# Patient Record
Sex: MALE | Race: WHITE | Employment: OTHER | URBAN - METROPOLITAN AREA
[De-identification: names, ages, dates, MRNs, and addresses within clinical notes are randomized per-mention and may not be internally consistent; named-entity substitution may affect disease eponyms.]

---

## 2017-02-08 ENCOUNTER — APPOINTMENT (OUTPATIENT)
Dept: GENERAL RADIOLOGY | Facility: HOSPITAL | Age: 82
DRG: 312 | End: 2017-02-08
Payer: MEDICARE

## 2017-02-08 ENCOUNTER — HOSPITAL ENCOUNTER (INPATIENT)
Facility: HOSPITAL | Age: 82
LOS: 4 days | Discharge: HOME | DRG: 312 | End: 2017-02-14
Attending: EMERGENCY MEDICINE | Admitting: HOSPITALIST
Payer: MEDICARE

## 2017-02-08 ENCOUNTER — APPOINTMENT (OUTPATIENT)
Dept: GENERAL RADIOLOGY | Facility: HOSPITAL | Age: 82
DRG: 312 | End: 2017-02-08
Attending: EMERGENCY MEDICINE
Payer: MEDICARE

## 2017-02-08 DIAGNOSIS — E86.0 DEHYDRATION: ICD-10-CM

## 2017-02-08 DIAGNOSIS — I95.1 ORTHOSTATIC HYPOTENSION: Primary | ICD-10-CM

## 2017-02-08 LAB
ANION GAP SERPL CALC-SCNC: 13 MMOL/L (ref 0–18)
BASOPHILS # BLD: 0.1 K/UL (ref 0–0.2)
BASOPHILS NFR BLD: 1 %
BUN SERPL-MCNC: 30 MG/DL (ref 8–20)
BUN/CREAT SERPL: 17.2 (ref 10–20)
CALCIUM SERPL-MCNC: 9.4 MG/DL (ref 8.5–10.5)
CHLORIDE SERPL-SCNC: 107 MMOL/L (ref 95–110)
CO2 SERPL-SCNC: 20 MMOL/L (ref 22–32)
CREAT SERPL-MCNC: 1.74 MG/DL (ref 0.5–1.5)
EOSINOPHIL # BLD: 0.1 K/UL (ref 0–0.7)
EOSINOPHIL NFR BLD: 1 %
ERYTHROCYTE [DISTWIDTH] IN BLOOD BY AUTOMATED COUNT: 14.2 % (ref 11–15)
GLUCOSE SERPL-MCNC: 126 MG/DL (ref 70–99)
HCT VFR BLD AUTO: 42 % (ref 41–52)
HGB BLD-MCNC: 14.2 G/DL (ref 13.5–17.5)
LYMPHOCYTES # BLD: 1.1 K/UL (ref 1–4)
LYMPHOCYTES NFR BLD: 15 %
MCH RBC QN AUTO: 33 PG (ref 27–32)
MCHC RBC AUTO-ENTMCNC: 33.9 G/DL (ref 32–37)
MCV RBC AUTO: 97.3 FL (ref 80–100)
MONOCYTES # BLD: 0.6 K/UL (ref 0–1)
MONOCYTES NFR BLD: 8 %
NEUTROPHILS # BLD AUTO: 5.8 K/UL (ref 1.8–7.7)
NEUTROPHILS NFR BLD: 76 %
OSMOLALITY UR CALC.SUM OF ELEC: 298 MOSM/KG (ref 275–295)
PLATELET # BLD AUTO: 201 K/UL (ref 140–400)
PMV BLD AUTO: 8.5 FL (ref 7.4–10.3)
POTASSIUM SERPL-SCNC: 4 MMOL/L (ref 3.3–5.1)
RBC # BLD AUTO: 4.32 M/UL (ref 4.5–5.9)
SODIUM SERPL-SCNC: 140 MMOL/L (ref 136–144)
TROPONIN I SERPL-MCNC: 0 NG/ML (ref ?–0.03)
TROPONIN I SERPL-MCNC: 0 NG/ML (ref ?–0.03)
WBC # BLD AUTO: 7.7 K/UL (ref 4–11)

## 2017-02-08 PROCEDURE — 99220 INITIAL OBSERVATION CARE,LEVL III: CPT | Performed by: HOSPITALIST

## 2017-02-08 PROCEDURE — 71010 XR CHEST AP PORTABLE  (CPT=71010): CPT

## 2017-02-08 RX ORDER — ASPIRIN 325 MG
325 TABLET ORAL DAILY
Status: DISCONTINUED | OUTPATIENT
Start: 2017-02-08 | End: 2017-02-14

## 2017-02-08 RX ORDER — DOCUSATE SODIUM 100 MG/1
100 CAPSULE, LIQUID FILLED ORAL 2 TIMES DAILY
Status: DISCONTINUED | OUTPATIENT
Start: 2017-02-08 | End: 2017-02-14

## 2017-02-08 RX ORDER — POLYETHYLENE GLYCOL 3350 17 G/17G
17 POWDER, FOR SOLUTION ORAL DAILY PRN
Status: DISCONTINUED | OUTPATIENT
Start: 2017-02-08 | End: 2017-02-14

## 2017-02-08 RX ORDER — ONDANSETRON 2 MG/ML
4 INJECTION INTRAMUSCULAR; INTRAVENOUS EVERY 6 HOURS PRN
Status: DISCONTINUED | OUTPATIENT
Start: 2017-02-08 | End: 2017-02-14

## 2017-02-08 RX ORDER — ACETAMINOPHEN 325 MG/1
650 TABLET ORAL EVERY 6 HOURS PRN
Status: DISCONTINUED | OUTPATIENT
Start: 2017-02-08 | End: 2017-02-14

## 2017-02-08 RX ORDER — SODIUM PHOSPHATE, DIBASIC AND SODIUM PHOSPHATE, MONOBASIC 7; 19 G/133ML; G/133ML
1 ENEMA RECTAL ONCE AS NEEDED
Status: ACTIVE | OUTPATIENT
Start: 2017-02-08 | End: 2017-02-08

## 2017-02-08 RX ORDER — ATORVASTATIN CALCIUM 10 MG/1
10 TABLET, FILM COATED ORAL NIGHTLY
Status: DISCONTINUED | OUTPATIENT
Start: 2017-02-08 | End: 2017-02-14

## 2017-02-08 RX ORDER — NITROGLYCERIN 0.4 MG/1
0.4 TABLET SUBLINGUAL EVERY 5 MIN PRN
Status: DISCONTINUED | OUTPATIENT
Start: 2017-02-08 | End: 2017-02-14

## 2017-02-08 RX ORDER — LEVOTHYROXINE SODIUM 0.05 MG/1
50 TABLET ORAL
Status: DISCONTINUED | OUTPATIENT
Start: 2017-02-09 | End: 2017-02-14

## 2017-02-08 RX ORDER — SODIUM CHLORIDE 9 MG/ML
INJECTION, SOLUTION INTRAVENOUS CONTINUOUS
Status: DISCONTINUED | OUTPATIENT
Start: 2017-02-08 | End: 2017-02-11

## 2017-02-08 RX ORDER — BISACODYL 10 MG
10 SUPPOSITORY, RECTAL RECTAL
Status: DISCONTINUED | OUTPATIENT
Start: 2017-02-08 | End: 2017-02-14

## 2017-02-08 NOTE — H&P
[facility]    PATIENT'S NAME: Dangelo Mabry   ATTENDING PHYSICIAN: Seamus Hurtado MD   PATIENT ACCOUNT#:   [de-identified]    LOCATION:  [place] [address]  MEDICAL RECORD #:   [id number]       YOB: 1931  ADMISSION DATE:       02/08/201 against a chair, developed ecchymosis on both eyes, but he did not seek medical attention. Denies any chest pain or shortness of breath. No palpitations. Other 12-point review of systems is negative. PHYSICAL EXAMINATION:    GENERAL:  Alert.   Cornell Holland

## 2017-02-08 NOTE — ED NOTES
Patient is resting comfortably. Pt updated on plan of care. Fall precautions maintained. Left pt watching TV.

## 2017-02-08 NOTE — ED PROVIDER NOTES
Patient Seen in: [facility] AND [facility] Emergency Department    History   No chief complaint on file.     Stated Complaint: Syncope     HPI    The patient is an 80-year-old male who went to an outpatient lab today for routine lab work and when he was walking i 10 MG Oral Tab,     Levothyroxine Sodium (SYNTHROID) 50 MCG Oral Tab,     lisinopril (PRINIVIL,ZESTRIL) 5 MG Oral Tab,         Family History   Problem Relation Age of Onset   • Glaucoma Neg    • Macular degeneration Neg    • Retinal detachment Neg    • Kalani Zarate Bowel sounds are normal. He exhibits no distension and no mass. There is no tenderness. There is no rebound and no guarding. Musculoskeletal: Normal range of motion. He exhibits no edema or tenderness.    Neurological: He is alert and oriented to person, rest    Pulse Ox: 97%, Normal, room air    Cardiac Monitor: Pulse Readings from Last 1 Encounters:  02/08/17 : 78  , sinus, normal     Radiology findings: Xr Chest Ap Portable  (cpt=71010)    2/8/2017  CONCLUSION:  1. No acute cardiopulmonary disease.  Mini

## 2017-02-08 NOTE — ED NOTES
Pt resting on cart in no acute distress. Pt denies complaints at this time. Pt requesting to go home. \"I feel fine. Why do I need to stay? \". Pt denies c/o dizziness, chest pain or SOB.

## 2017-02-08 NOTE — ED NOTES
Orthostatics done per order. Pt states he \"wasn't dizzy, but I was real good standing, either\". Pt appeared steady on feet when standing.

## 2017-02-08 NOTE — CM/SW NOTE
Spoke with patient regarding where he left his car s/p coming here via ems. Pt. Reports car being at a [facility] outpatient lab in [address] [place] [address].  Pt. States, \"It's been taken care of-- my car is fine but I'm worried about getting back to my car and if it

## 2017-02-08 NOTE — ED INITIAL ASSESSMENT (HPI)
Pt to ED via EMS c/o near-syncopal episode while walking into clinic to have blood drawn. Pt was assisted to sitting position and did not pass out. Pt states he has not been eating well lately and did take his meds this morning.  Pt denies pain/complaints

## 2017-02-09 ENCOUNTER — APPOINTMENT (OUTPATIENT)
Dept: CV DIAGNOSTICS | Facility: HOSPITAL | Age: 82
DRG: 312 | End: 2017-02-09
Attending: HOSPITALIST
Payer: MEDICARE

## 2017-02-09 ENCOUNTER — APPOINTMENT (OUTPATIENT)
Dept: NUCLEAR MEDICINE | Facility: HOSPITAL | Age: 82
DRG: 312 | End: 2017-02-09
Attending: HOSPITALIST
Payer: MEDICARE

## 2017-02-09 LAB
ANION GAP SERPL CALC-SCNC: 8 MMOL/L (ref 0–18)
BASOPHILS # BLD: 0.1 K/UL (ref 0–0.2)
BASOPHILS NFR BLD: 1 %
BUN SERPL-MCNC: 26 MG/DL (ref 8–20)
BUN/CREAT SERPL: 17 (ref 10–20)
CALCIUM SERPL-MCNC: 8.6 MG/DL (ref 8.5–10.5)
CHLORIDE SERPL-SCNC: 111 MMOL/L (ref 95–110)
CHOLEST SERPL-MCNC: 114 MG/DL (ref 110–200)
CO2 SERPL-SCNC: 22 MMOL/L (ref 22–32)
CREAT SERPL-MCNC: 1.53 MG/DL (ref 0.5–1.5)
EOSINOPHIL # BLD: 0.1 K/UL (ref 0–0.7)
EOSINOPHIL NFR BLD: 2 %
ERYTHROCYTE [DISTWIDTH] IN BLOOD BY AUTOMATED COUNT: 14.1 % (ref 11–15)
GLUCOSE SERPL-MCNC: 99 MG/DL (ref 70–99)
HCT VFR BLD AUTO: 36.4 % (ref 41–52)
HDLC SERPL-MCNC: 37 MG/DL
HGB BLD-MCNC: 12.4 G/DL (ref 13.5–17.5)
LDLC SERPL CALC-MCNC: 60 MG/DL (ref 0–99)
LYMPHOCYTES # BLD: 1.3 K/UL (ref 1–4)
LYMPHOCYTES NFR BLD: 22 %
MCH RBC QN AUTO: 33.1 PG (ref 27–32)
MCHC RBC AUTO-ENTMCNC: 34.2 G/DL (ref 32–37)
MCV RBC AUTO: 97 FL (ref 80–100)
MONOCYTES # BLD: 0.6 K/UL (ref 0–1)
MONOCYTES NFR BLD: 9 %
NEUTROPHILS # BLD AUTO: 3.9 K/UL (ref 1.8–7.7)
NEUTROPHILS NFR BLD: 66 %
NONHDLC SERPL-MCNC: 77 MG/DL
OSMOLALITY UR CALC.SUM OF ELEC: 297 MOSM/KG (ref 275–295)
PLATELET # BLD AUTO: 181 K/UL (ref 140–400)
PMV BLD AUTO: 8.4 FL (ref 7.4–10.3)
POTASSIUM SERPL-SCNC: 4 MMOL/L (ref 3.3–5.1)
RBC # BLD AUTO: 3.75 M/UL (ref 4.5–5.9)
SODIUM SERPL-SCNC: 141 MMOL/L (ref 136–144)
TRIGL SERPL-MCNC: 87 MG/DL (ref 1–149)
WBC # BLD AUTO: 6 K/UL (ref 4–11)

## 2017-02-09 PROCEDURE — 78452 HT MUSCLE IMAGE SPECT MULT: CPT

## 2017-02-09 PROCEDURE — 99232 SBSQ HOSP IP/OBS MODERATE 35: CPT | Performed by: HOSPITALIST

## 2017-02-09 PROCEDURE — 93306 TTE W/DOPPLER COMPLETE: CPT

## 2017-02-09 PROCEDURE — 93017 CV STRESS TEST TRACING ONLY: CPT

## 2017-02-09 PROCEDURE — 93018 CV STRESS TEST I&R ONLY: CPT | Performed by: INTERNAL MEDICINE

## 2017-02-09 PROCEDURE — 93016 CV STRESS TEST SUPVJ ONLY: CPT | Performed by: INTERNAL MEDICINE

## 2017-02-09 PROCEDURE — 93306 TTE W/DOPPLER COMPLETE: CPT | Performed by: INTERNAL MEDICINE

## 2017-02-09 RX ORDER — SODIUM CHLORIDE 0.9 % (FLUSH) 0.9 %
SYRINGE (ML) INJECTION
Status: COMPLETED
Start: 2017-02-09 | End: 2017-02-09

## 2017-02-09 NOTE — PAYOR COMM NOTE
Gagan Harrison #[id number]  (80 year old M)       [facility] [id number]         Abhay Moreno MD Physician Signed  H&P 2/8/2017  1:20 PM      Expand All Collapse [facility]      PATIENT'S NAME:  Jhoana PACHECO    ATTENDING PHYSICIAN:  Franck Stinson lightheaded, especially when he stands up.  Lately it has been very difficult for him to ambulate around without feeling very dizzy.  One week ago he fell and he hit his face and nose against a chair, developed ecchymosis on both eyes, but he did not seek AM             OBSV

## 2017-02-09 NOTE — PHYSICAL THERAPY NOTE
PHYSICAL THERAPY EVALUATION - INPATIENT     Room Number: 933/121-L  Evaluation Date: 2/9/2017  Type of Evaluation: Initial  Physician Order: PT Eval and Treat    Presenting Problem: orthostatic hypotension  Reason for Therapy: Mobility Dysfunction and level  Stairs to Enter : 0     Stairs to [employer]: 0       Lives With: Alone  Drives: Yes  Patient Owned Equipment: None       Prior Level of [place]: Pt was independent w/ ADLs and amb w/o AD. SUBJECTIVE  \"I can walk to the bathroom just fine. \" addressed; Alarm set    ASSESSMENT     Patient is a 80year old male admitted 2/8/2017 for orthostatic hypotension. In this PT evaluation, the patient presents with the following impairments: Consulted w/ RN prior to seeing pt.  Pt was received supine in b training;Strengthening  Rehab Potential : Good  Frequency (Obs): 3x/week         CURRENT GOALS    Goal #1 Patient is able to demonstrate supine - sit EOB @ level: modified independent     Goal #2 Patient is able to demonstrate transfers Sit to/from Stand a

## 2017-02-09 NOTE — PROGRESS NOTES
[facility]    Progress Note    Lexus Bell Patient Status:  Observation    10/14/1931 MRN [id number]   Location [facility] Attending Jae Torres MD   Hosp Day # 1 PCP Nadia Faith III       Subjective:   Lexus Bell is a tab 50 mcg, 50 mcg, Oral, Before breakfast    Assessment and Plan:     Orthostatic hypotension  Likely 2/2 hypovolemia, poor PO intake  Hold BP meds  S/p lexiscan stress test which was negative  2D echo showing preserved EF, mild L atrial enlargement  Will

## 2017-02-09 NOTE — DISCHARGE PLANNING
SW attempted to meet w/ pt to discuss plan of care per MD order. Pt was currently not in room at this time. SW to follow up when appropriate.     Denton Taylor, [address]

## 2017-02-10 LAB — TSH SERPL-ACNC: 2.07 UIU/ML (ref 0.34–5.6)

## 2017-02-10 PROCEDURE — 99232 SBSQ HOSP IP/OBS MODERATE 35: CPT | Performed by: HOSPITALIST

## 2017-02-10 NOTE — PROGRESS NOTES
[facility]    Progress Note    Tony Kelly Patient Status:  Observation    10/14/1931 MRN [id number]   Location [facility] Attending Malgorzata Olivares MD   Hosp Day # 2 PCP Bettina Storey III       Subjective:   Tony Kelly is a 10 mg, Oral, Nightly  •  Levothyroxine Sodium (SYNTHROID) tab 50 mcg, 50 mcg, Oral, Before breakfast    Assessment and Plan:     Orthostatic hypotension  persistent  Likely 2/2 hypovolemia, poor PO intake  Hold BP meds  S/p lexiscan stress test which was n

## 2017-02-10 NOTE — PHYSICAL THERAPY NOTE
PHYSICAL THERAPY TREATMENT NOTE - INPATIENT    Room Number: 217/810-D       Presenting Problem: orthostatic hypotension    Problem List  Principal Problem:    Orthostatic hypotension  Active Problems:    Dehydration      ASSESSMENT   Patient up in B/S elise a chair (including a wheelchair)?: A Little   -   Need to walk in hospital room?: A Little   -   Climbing 3-5 steps with a railing?: A Little    AM-PAC Score:  Raw Score: 18   PT Approx Degree of Impairment Score: 46.58%   Standardized Score (AM-PAC Scale)

## 2017-02-10 NOTE — PLAN OF CARE
CARDIOVASCULAR - ADULT    • Maintains optimal cardiac output and hemodynamic stability Progressing    • Absence of cardiac arrhythmias or at baseline Progressing        SAFETY ADULT - FALL    • Free from fall injury Progressing          Orthostatics remain

## 2017-02-10 NOTE — DISCHARGE PLANNING
SW met w/ pt to discuss plan of care per MD order. Pt reported that he lives at home alone in a condo w/ elevator. Pt reported no DME/services at this time. Pt reported being independent w/ ADL's and drives. Pt reported no hx of SNF/HHC.  Pt reported that h

## 2017-02-10 NOTE — OCCUPATIONAL THERAPY NOTE
OCCUPATIONAL THERAPY EVALUATION - INPATIENT     Room Number: 796/915-M  Evaluation Date: 2/10/2017  Type of Evaluation: Initial  Presenting Problem:  (Near Syncope)    Physician Order: IP Consult to Occupational Therapy  Reason for Therapy: ADL/IADL Dysfun hypertension    • Prostate cancer ([facility] [address] 1989   • Hypothyroidism      Hypothyroidism, primary   • Vitreous floaters of both eyes    • Entropion of right lower eyelid 2012   • Ophthalmological disorder 2013     Capsular phimosis after cataract, OD       Past A Little  -   Bathing (including washing, rinsing, drying)?: A Little  -   Toileting, which includes using toilet, bedpan or urinal? : A Little  -   Putting on and taking off regular upper body clothing?: A Little  -   Taking care of personal grooming such

## 2017-02-11 LAB
ANION GAP SERPL CALC-SCNC: 5 MMOL/L (ref 0–18)
BASOPHILS # BLD: 0 K/UL (ref 0–0.2)
BASOPHILS NFR BLD: 1 %
BUN SERPL-MCNC: 18 MG/DL (ref 8–20)
BUN/CREAT SERPL: 13.6 (ref 10–20)
CALCIUM SERPL-MCNC: 8.3 MG/DL (ref 8.5–10.5)
CHLORIDE SERPL-SCNC: 114 MMOL/L (ref 95–110)
CO2 SERPL-SCNC: 23 MMOL/L (ref 22–32)
CREAT SERPL-MCNC: 1.32 MG/DL (ref 0.5–1.5)
EOSINOPHIL # BLD: 0.2 K/UL (ref 0–0.7)
EOSINOPHIL NFR BLD: 3 %
ERYTHROCYTE [DISTWIDTH] IN BLOOD BY AUTOMATED COUNT: 14.2 % (ref 11–15)
GLUCOSE SERPL-MCNC: 100 MG/DL (ref 70–99)
HCT VFR BLD AUTO: 33.1 % (ref 41–52)
HGB BLD-MCNC: 11.4 G/DL (ref 13.5–17.5)
LYMPHOCYTES # BLD: 1.2 K/UL (ref 1–4)
LYMPHOCYTES NFR BLD: 20 %
MCH RBC QN AUTO: 33.3 PG (ref 27–32)
MCHC RBC AUTO-ENTMCNC: 34.5 G/DL (ref 32–37)
MCV RBC AUTO: 96.4 FL (ref 80–100)
MONOCYTES # BLD: 0.6 K/UL (ref 0–1)
MONOCYTES NFR BLD: 9 %
NEUTROPHILS # BLD AUTO: 4.3 K/UL (ref 1.8–7.7)
NEUTROPHILS NFR BLD: 68 %
OSMOLALITY UR CALC.SUM OF ELEC: 296 MOSM/KG (ref 275–295)
PLATELET # BLD AUTO: 164 K/UL (ref 140–400)
PMV BLD AUTO: 8.2 FL (ref 7.4–10.3)
POTASSIUM SERPL-SCNC: 4.1 MMOL/L (ref 3.3–5.1)
RBC # BLD AUTO: 3.43 M/UL (ref 4.5–5.9)
SODIUM SERPL-SCNC: 142 MMOL/L (ref 136–144)
WBC # BLD AUTO: 6.3 K/UL (ref 4–11)

## 2017-02-11 PROCEDURE — 99223 1ST HOSP IP/OBS HIGH 75: CPT | Performed by: OTHER

## 2017-02-11 PROCEDURE — 99232 SBSQ HOSP IP/OBS MODERATE 35: CPT | Performed by: HOSPITALIST

## 2017-02-11 NOTE — CONSULTS
Neurology Inpatient Consult Note    Ceci Flores : 10/14/1931   Referring Physician: Dr. Zoila Espinoza  HPI:     Ceci Flores is a 80year old male who is being seen in neurologic evaluation. Patient is being seen in evaluation for dizziness.   Per review Name:                       Years of Education:                 Number of children:               Social History Main Topics    Smoking Status: Former Smoker                   Packs/Day: 0.00  Years:         Alcohol Use: No              Drug Use: No rule out component of autonomic neuropathy. –Daily orthostatic vital signs, orthostatic precautions    –If orthostasis continues despite hydration, may consider midodrine or Florinef      Neurology service will continue to follow.   Please page with any

## 2017-02-11 NOTE — PROGRESS NOTES
[facility]    Progress Note    Odin Eng Patient Status:  Observation    10/14/1931 MRN [id number]   Location [address] [facility] Attending Kellie Salgado MD   Hosp Day # 3 PCP Illona Fail III       Subjective:   Odin Eng is a breakfast    Assessment and Plan:     Orthostatic hypotension  persistent  Likely 2/2 hypovolemia, poor PO intake  Cannot r/o autonomic dysfunction  Hold BP meds  S/p lexiscan stress test which was negative  2D echo showing preserved EF, mild L atrial enla

## 2017-02-12 PROCEDURE — 99232 SBSQ HOSP IP/OBS MODERATE 35: CPT | Performed by: HOSPITALIST

## 2017-02-12 PROCEDURE — 99232 SBSQ HOSP IP/OBS MODERATE 35: CPT | Performed by: OTHER

## 2017-02-12 RX ORDER — ENALAPRILAT 2.5 MG/2ML
0.62 INJECTION INTRAVENOUS ONCE
Status: COMPLETED | OUTPATIENT
Start: 2017-02-12 | End: 2017-02-12

## 2017-02-12 NOTE — PROGRESS NOTES
Neurology Inpatient Follow-up Note      HPI:     Patient being seen in follow-up. Offers no complaints. Still orthostatic by vital signs, but denies any subjective dizziness or lightheadedness. Able to ambulate with PT today.       Past Medical Hisotory: Please page with any questions / concerns.     Reed Chambers MD  [address] [place] [address] [phone]

## 2017-02-12 NOTE — PROGRESS NOTES
[facility]    Progress Note    Bryn Clemons Patient Status:  Observation    10/14/1931 MRN [id number]   Location [facility] Attending Cecilia Banks MD   Hosp Day # 4 PCP Dorothy Glover III       Subjective:   Bryn Clemons is a and Plan:     Orthostatic hypotension  persistent  M/l 2/2 hypovolemia, poor PO intake  Cannot r/o autonomic dysfunction  Hold BP meds  S/p lexiscan stress test which was negative  2D echo showing preserved EF, mild L atrial enlargement   PT/OT on consult

## 2017-02-12 NOTE — PHYSICAL THERAPY NOTE
PHYSICAL THERAPY TREATMENT NOTE - INPATIENT    Room Number: 021/751-Z     Session: PT Progress        Presenting Problem: Orthostatic Hypotension    Problem List  Principal Problem:    Orthostatic hypotension  Active Problems:    Dehydration      Past Med and blankets)?: None   -   Sitting down on and standing up from a chair with arms (e.g., wheelchair, bedside commode, etc.): None   -   Moving from lying on back to sitting on the side of the bed?: None   How much help from another person does the patient able to demonstrate supine - sit EOB @ level: modified independent       Goal #2   Patient is able to demonstrate transfers Sit to/from Stand at assistance level: modified independent       Goal #3   Patient is able to ambulate 300 feet with assist device:

## 2017-02-13 PROCEDURE — 99232 SBSQ HOSP IP/OBS MODERATE 35: CPT | Performed by: OTHER

## 2017-02-13 PROCEDURE — 99232 SBSQ HOSP IP/OBS MODERATE 35: CPT | Performed by: HOSPITALIST

## 2017-02-13 RX ORDER — LISINOPRIL 5 MG/1
2.5 TABLET ORAL NIGHTLY
Qty: 15 TABLET | Refills: 0 | Status: SHIPPED | OUTPATIENT
Start: 2017-02-13 | End: 2017-03-15

## 2017-02-13 NOTE — PROGRESS NOTES
[facility]    Progress Note    Irwin Mayer Patient Status:  Observation    10/14/1931 MRN [id number]   Location [facility] Attending Radha Pineda MD   Hosp Day # 5 PCP Juan Guy III       Subjective:   Irwin Mayer is a mcg, Oral, Before breakfast    Assessment and Plan:     Orthostatic hypotension  persistent  M/l 2/2 hypovolemia, poor PO intake  Cannot r/o autonomic dysfunction  Hold BP meds  S/p lexiscan stress test which was negative  2D echo showing preserved EF, mil

## 2017-02-13 NOTE — DISCHARGE PLANNING
SW received call from therapy indicating the pt is requesting caretaker resources. SW initiated referral to [facility] [place] for evaluation of caretaker programs. Therapy recommendation is for home health services. Residential chosen.  Refer

## 2017-02-13 NOTE — HOME CARE LIAISON
MET WITH PTNT TO DISCUSS HOME HEALTH SERVICES AND COVERAGE CRITERIA. PTNT AGREEABLE TO [address] [place] [address]. PTNT GIVEN RESIDENTIAL BROCHURE AND CONTACT INFORMATION. Felix RN/PT.   PTNT ADMITTED AT [address] [place] [address] 2/8/17 D/T CHRISTY

## 2017-02-13 NOTE — PHYSICAL THERAPY NOTE
PHYSICAL THERAPY TREATMENT NOTE - INPATIENT    Room Number: 341/453-O       Presenting Problem: Orthostatic Hypotension    Problem List  Principal Problem:    Orthostatic hypotension  Active Problems:    Dehydration      ASSESSMENT   Consulted w/ RN prior chair with arms (e.g., wheelchair, bedside commode, etc.): A Little   -   Moving from lying on back to sitting on the side of the bed?: A Little   How much help from another person does the patient currently need. ..   -   Moving to and from a bed to a ganga

## 2017-02-13 NOTE — PROGRESS NOTES
Neurology Inpatient Follow-up Note      HPI:     Patient being seen in follow-up. Still orthostatic by vital signs, but denies any subjective dizziness or lightheadedness.        Past Medical Hisotory:  Reviewed    Medications:  Reviewed    Allergies:  No

## 2017-02-13 NOTE — DISCHARGE PLANNING
Therapy recommendation is for home health services. Residential chosen. Referral made to [facility] C/Anny who will check the pt's insurance.  [place] [id number] orders and Face to Face encounter are needed to confirm the referral.    ANGLE delgado [id number]

## 2017-02-13 NOTE — PLAN OF CARE
Maintains optimal cardiac output and hemodynamic stability Progressing      Absence of cardiac arrhythmias or at baseline Progressing      Free from fall injury Progressing    No c/o dizziness, bed alarm maintained, up with walker and one standbye assist

## 2017-02-13 NOTE — DISCHARGE SUMMARY
[facility]    Discharge Summary    Freida Nash Patient Status:  Inpatient    10/14/1931 MRN [id number]   Location [facility] Attending Pa Daniel MD   Hosp Day # 5 PCP Adam Lynn III     Date of Admission: 2017 Apryl Bowman BS+  MUSCULOSKELETAL:  There was no deformity.  There was full range of motion in all the extremities.    EXTREMITIES: There was no edema  NEUROLOGIC: no focal defecits    History of Present Illness:    The patient is an 80-year-old  male who prese taking these medications       Instructions Prescription details    aspirin 81 MG Tabs   Last time this was given:  325 mg on 2/13/2017  8:07 AM        Take 81 mg by mouth nightly.     Refills:  0       Atorvastatin Calcium 10 MG Tabs   Last time this was g

## 2017-02-14 VITALS
DIASTOLIC BLOOD PRESSURE: 51 MMHG | SYSTOLIC BLOOD PRESSURE: 91 MMHG | RESPIRATION RATE: 16 BRPM | WEIGHT: 176.19 LBS | BODY MASS INDEX: 27.65 KG/M2 | HEART RATE: 81 BPM | HEIGHT: 67 IN | OXYGEN SATURATION: 96 % | TEMPERATURE: 98 F

## 2017-02-14 PROCEDURE — 99239 HOSP IP/OBS DSCHRG MGMT >30: CPT | Performed by: HOSPITALIST

## 2017-02-14 NOTE — OCCUPATIONAL THERAPY NOTE
OCCUPATIONAL THERAPY TREATMENT NOTE - INPATIENT     Room Number: 480/146-R          Presenting Problem: orthostatic hypotension    Problem List  Principal Problem:    Orthostatic hypotension  Active Problems:    Dehydration      ASSESSMENT   Pt seen in  taking off regular upper body clothing?: None  -   Taking care of personal grooming such as brushing teeth?: None  -   Eating meals?: None    AM-PAC Score:  Score: 23  Approx Degree of Impairment: 15.86%  Standardized Score (AM-PAC Scale): 51.12  CMS Modif

## 2017-02-14 NOTE — PHYSICAL THERAPY NOTE
PHYSICAL THERAPY TREATMENT NOTE - INPATIENT    Room Number: 753/402-X       Presenting Problem: Orthostatic Hypotension    Problem List  Principal Problem:    Orthostatic hypotension  Active Problems:    Dehydration      ASSESSMENT   Patient received sitt Pressure: Sitting 112/67 and Standing 03/93    AM-PAC '6-Clicks' INPATIENT SHORT FORM - BASIC MOBILITY  How much difficulty does the patient currently have. ..  -   Turning over in bed (including adjusting bedclothes, sheets and blankets)?: None   -   Sitti

## 2017-02-14 NOTE — PAYOR COMM NOTE
Alea Hines #[id number]  (80 year old M)       [facility] [id number]         Gloria Joyce MD Physician Signed  H&P 2/8/2017  1:20 PM      Expand All Collapse [facility]      PATIENT'S NAME:  Karyle Maier D    ATTENDING PHYSICIAN:  Mario Yao lightheaded, especially when he stands up.  Lately it has been very difficult for him to ambulate around without feeling very dizzy.  One week ago he fell and he hit his face and nose against a chair, developed ecchymosis on both eyes, but he did not seek DA Hall Lajulien #[id number]  (80 year old M)       [facility] [id number]         Irvin Manriquez MD Physician Signed  Discharge Summaries 2/13/2017  3:44 PM      Expand All Collapse [facility]    Discharge Summary Calm and cooperative    HEENT:  Head was atraumatic and normocephalic.  Eyes: Sclera was anicteric.  Pupils were equal.   NECK:  Supple.  There was no JVD.    CHEST:  Symmetrical movement on inspiration  CARDIAC: S1 S2+, RRR  LUNGS:  CTAB  ABDOMEN: Non-dis          Instructions  Prescription details      lisinopril 5 MG Tabs    Commonly known as:  GHULAM ELMROESTRIZANDER    What changed:  how much to take           Take 0.5 tablets (2.5 mg total) by mouth nightly.      Stop taking on:  3/15/2017    Quantity:  15 ta

## 2017-05-02 ENCOUNTER — APPOINTMENT (OUTPATIENT)
Dept: GENERAL RADIOLOGY | Facility: HOSPITAL | Age: 82
DRG: 640 | End: 2017-05-02
Attending: EMERGENCY MEDICINE
Payer: MEDICARE

## 2017-05-02 ENCOUNTER — HOSPITAL ENCOUNTER (INPATIENT)
Facility: HOSPITAL | Age: 82
LOS: 3 days | Discharge: NURSING, REHAB OR LONG-TERM CARE | DRG: 640 | End: 2017-05-05
Attending: EMERGENCY MEDICINE | Admitting: HOSPITALIST
Payer: MEDICARE

## 2017-05-02 DIAGNOSIS — R53.1 WEAKNESS GENERALIZED: ICD-10-CM

## 2017-05-02 DIAGNOSIS — N17.9 ACUTE KIDNEY INJURY (HCC): Primary | ICD-10-CM

## 2017-05-02 DIAGNOSIS — E86.0 DEHYDRATION: ICD-10-CM

## 2017-05-02 DIAGNOSIS — I10 ESSENTIAL HYPERTENSION: ICD-10-CM

## 2017-05-02 PROBLEM — R79.89 AZOTEMIA: Status: ACTIVE | Noted: 2017-05-02

## 2017-05-02 PROBLEM — R73.9 HYPERGLYCEMIA: Status: ACTIVE | Noted: 2017-05-02

## 2017-05-02 PROCEDURE — 93010 ELECTROCARDIOGRAM REPORT: CPT | Performed by: EMERGENCY MEDICINE

## 2017-05-02 PROCEDURE — 85025 COMPLETE CBC W/AUTO DIFF WBC: CPT | Performed by: EMERGENCY MEDICINE

## 2017-05-02 PROCEDURE — 84443 ASSAY THYROID STIM HORMONE: CPT | Performed by: EMERGENCY MEDICINE

## 2017-05-02 PROCEDURE — 99285 EMERGENCY DEPT VISIT HI MDM: CPT

## 2017-05-02 PROCEDURE — 96360 HYDRATION IV INFUSION INIT: CPT

## 2017-05-02 PROCEDURE — 96361 HYDRATE IV INFUSION ADD-ON: CPT

## 2017-05-02 PROCEDURE — 80076 HEPATIC FUNCTION PANEL: CPT | Performed by: EMERGENCY MEDICINE

## 2017-05-02 PROCEDURE — 84484 ASSAY OF TROPONIN QUANT: CPT | Performed by: EMERGENCY MEDICINE

## 2017-05-02 PROCEDURE — 93005 ELECTROCARDIOGRAM TRACING: CPT

## 2017-05-02 PROCEDURE — 71010 XR CHEST AP PORTABLE  (CPT=71010): CPT

## 2017-05-02 PROCEDURE — 80048 BASIC METABOLIC PNL TOTAL CA: CPT | Performed by: EMERGENCY MEDICINE

## 2017-05-02 RX ORDER — SODIUM CHLORIDE 9 MG/ML
INJECTION, SOLUTION INTRAVENOUS CONTINUOUS
Status: DISCONTINUED | OUTPATIENT
Start: 2017-05-03 | End: 2017-05-03

## 2017-05-02 RX ORDER — HEPARIN SODIUM 5000 [USP'U]/ML
5000 INJECTION, SOLUTION INTRAVENOUS; SUBCUTANEOUS EVERY 12 HOURS
Status: DISCONTINUED | OUTPATIENT
Start: 2017-05-03 | End: 2017-05-05

## 2017-05-02 RX ORDER — ACETAMINOPHEN 325 MG/1
650 TABLET ORAL EVERY 6 HOURS PRN
Status: DISCONTINUED | OUTPATIENT
Start: 2017-05-02 | End: 2017-05-03

## 2017-05-02 RX ORDER — SODIUM CHLORIDE 9 MG/ML
INJECTION, SOLUTION INTRAVENOUS CONTINUOUS
Status: DISCONTINUED | OUTPATIENT
Start: 2017-05-03 | End: 2017-05-04

## 2017-05-02 RX ORDER — ONDANSETRON 2 MG/ML
4 INJECTION INTRAMUSCULAR; INTRAVENOUS EVERY 6 HOURS PRN
Status: DISCONTINUED | OUTPATIENT
Start: 2017-05-02 | End: 2017-05-05

## 2017-05-02 RX ORDER — SODIUM CHLORIDE 9 MG/ML
INJECTION, SOLUTION INTRAVENOUS ONCE
Status: COMPLETED | OUTPATIENT
Start: 2017-05-02 | End: 2017-05-02

## 2017-05-03 ENCOUNTER — APPOINTMENT (OUTPATIENT)
Dept: CT IMAGING | Facility: HOSPITAL | Age: 82
DRG: 640 | End: 2017-05-03
Attending: HOSPITALIST
Payer: MEDICARE

## 2017-05-03 PROBLEM — G93.40 ENCEPHALOPATHY ACUTE: Status: ACTIVE | Noted: 2017-05-03

## 2017-05-03 PROCEDURE — 85025 COMPLETE CBC W/AUTO DIFF WBC: CPT | Performed by: HOSPITALIST

## 2017-05-03 PROCEDURE — 80048 BASIC METABOLIC PNL TOTAL CA: CPT | Performed by: HOSPITALIST

## 2017-05-03 PROCEDURE — 70450 CT HEAD/BRAIN W/O DYE: CPT

## 2017-05-03 PROCEDURE — 97530 THERAPEUTIC ACTIVITIES: CPT

## 2017-05-03 PROCEDURE — 81003 URINALYSIS AUTO W/O SCOPE: CPT | Performed by: HOSPITALIST

## 2017-05-03 PROCEDURE — 97166 OT EVAL MOD COMPLEX 45 MIN: CPT

## 2017-05-03 RX ORDER — ATORVASTATIN CALCIUM 10 MG/1
10 TABLET, FILM COATED ORAL NIGHTLY
Status: DISCONTINUED | OUTPATIENT
Start: 2017-05-03 | End: 2017-05-05

## 2017-05-03 RX ORDER — LEVOTHYROXINE SODIUM 0.05 MG/1
50 TABLET ORAL
Status: DISCONTINUED | OUTPATIENT
Start: 2017-05-03 | End: 2017-05-05

## 2017-05-03 RX ORDER — 0.9 % SODIUM CHLORIDE 0.9 %
VIAL (ML) INJECTION
Status: COMPLETED
Start: 2017-05-03 | End: 2017-05-03

## 2017-05-03 RX ORDER — ASPIRIN 81 MG/1
81 TABLET ORAL NIGHTLY
Status: DISCONTINUED | OUTPATIENT
Start: 2017-05-03 | End: 2017-05-05

## 2017-05-03 RX ORDER — FOLIC ACID 1 MG/1
1 TABLET ORAL DAILY
Status: DISCONTINUED | OUTPATIENT
Start: 2017-05-03 | End: 2017-05-05

## 2017-05-03 RX ORDER — AMLODIPINE BESYLATE 2.5 MG/1
2.5 TABLET ORAL DAILY
Status: DISCONTINUED | OUTPATIENT
Start: 2017-05-03 | End: 2017-05-05

## 2017-05-03 RX ORDER — ACETAMINOPHEN 325 MG/1
650 TABLET ORAL EVERY 6 HOURS PRN
Status: DISCONTINUED | OUTPATIENT
Start: 2017-05-03 | End: 2017-05-05

## 2017-05-03 RX ORDER — MELATONIN
100 DAILY
Status: DISCONTINUED | OUTPATIENT
Start: 2017-05-03 | End: 2017-05-05

## 2017-05-03 NOTE — H&P
[facility] Hospitalist H&P       CC: Patient presents with:  Dehydration (metabolic/constitutional)       PCP: Ammy Hood III    History of Present Illness: Patient is a 80year old male with PMH sig for prostate cancer, hypothyroidism, HTN, who presents with con Neg    • Retinal detachment Neg    • Diabetes Neg        Review of Systems  Comprehensive ROS reviewed and negative except for what's stated above.      OBJECTIVE:  /62 mmHg  Pulse 74  Temp(Src) 97.2 °F (36.2 °C) (Oral)  Resp 18  Ht 5' 9\" (1.753 m) AO*2, repeats the same sentence over and over  - no focal findings, poss metabolic due to electrolytes vs. Infectious  - CXR negative, awaiting UA  - CT ordered  - continue IVF  - start thiamine and folic acid    Acute renal failure on CKD  - cre 2.1 on ad

## 2017-05-03 NOTE — OCCUPATIONAL THERAPY NOTE
OCCUPATIONAL THERAPY EVALUATION - INPATIENT     Room Number: 786/141-G  Evaluation Date: 5/3/2017  Type of Evaluation: Initial  Presenting Problem:  (NEISHA)    Physician Order: IP Consult to Occupational Therapy  Reason for Therapy: ADL/IADL Dysfunction and Hyperglycemia    Azotemia    Weakness generalized    Essential hypertension    NEISHA (acute kidney injury) ([facility] [address]    Encephalopathy acute      Past Medical History  Past Medical History   Diagnosis Date   • Cataract 2006     OU   • Ptosis of both eyelids 2012 ASSESSMENT  Upper extremity strength is within functional limits     COORDINATION  Gross Motor: WFL   Fine Motor: WFL     ACTIVITIES OF DAILY LIVING ASSESSMENT  AM-PAC ‘6-Clicks’ Inpatient Daily Activity Short Form  How much help from another person does t

## 2017-05-03 NOTE — ED PROVIDER NOTES
Patient Seen in: [facility] Emergency Department    History   Patient presents with:  Dehydration (metabolic/constitutional)    Stated Complaint:     HPI    Patient presents the emergency department complaining of feeling very weak.   He states [employer]    Positive for stated complaint:   Other systems are as noted in HPI. Constitutional and vital signs reviewed. All other systems reviewed and negative except as noted above.     [facility] and agreed except as otherwise other components within normal limits   CBC W/ DIFFERENTIAL - Abnormal; Notable for the following:     RBC 4.45 (*)     MCH 32.9 (*)     All other components within normal limits   TSH W REFLEX TO FREE T4 - Normal   TROPONIN I, 0 HOUR - Normal   CBC WITH D Admission           ICD-10-CM Noted POA    Acute kidney injury ([facility] [address] N17.9 5/2/2017 Unknown    Azotemia R79.89 5/2/2017 Yes    Hyperglycemia R73.9 5/2/2017 Yes

## 2017-05-03 NOTE — PAYOR COMM NOTE
Attending Physician: Syl Mosley MD    Review Type: ADMISSION   Reviewer: Carol Warren       Date: May 3, 2017 - 2:58 PM  Payor: [address] [place] [address] Number: [id number]  Admit date: 5/2/2017  7:18 PM   Admitted from Emergency Dept. EYE  2013    Comment Yag laser, OD       Medications :   aspirin 81 MG Oral Tab,  Take 81 mg by mouth nightly.      Levothyroxine Sodium (SYNTHROID) 50 MCG Oral Tab,  Take by mouth before breakfast.     Atorvastatin Calcium (LIPITOR) 10 MG Oral Tab,  Take 1 and dry. No rash noted. Nursing note and vitals reviewed.             ED Course     Labs Reviewed   BASIC METABOLIC PANEL (8) - Abnormal; Notable for the following:     Glucose 134 (*)     BUN 50 (*)     Creatinine 2.15 (*)     BUN/CREA Ratio 23.3 (*) Radiology exams  Viewed and reviewed by myself and findings discussed with patient including need for follow up    Discussed with Dr. Viridiana Remy. Patient will be admitted to a medical floor for dehydration and acute kidney injury.     Disposition and • Age-related nuclear cataract of left eye 8/20/2014   • Pseudophakia, right eye 8/20/2014   • History of entropion repair 8/20/2014   • Essential hypertension    • Prostate cancer ([facility] [address] 1989   • Hypothyroidism      Hypothyroidism, primary   • Vitreous normal, atraumatic, no cyanosis or edema. Skin: Skin color, texture, turgor normal. No rashes or lesions.     Neurologic: Normal strength,  No focal findings     Diagnostic Data:    CBC/Chem  Recent Labs   Lab  05/02/17 1958 05/03/17   0712   WBC  7.7 piv    Dispo: pending clinical course, requires inpatient admission due to acute encephalopathy, need for further work up and requirement of IVF    Outpatient records or previous hospital records reviewed.      Further recommendations pending patient's clin

## 2017-05-03 NOTE — PLAN OF CARE
Problem: NEUROLOGICAL - ADULT  Goal: Achieves stable or improved neurological status  INTERVENTIONS  - Assess for and report changes in neurological status  - Monitor temperature, glucose, and sodium.  Initiate appropriate interventions as ordered  Outcome: assistive devices as appropriate  - Consider OT/PT consult to assist with strengthening/mobility  - Encourage toileting schedule  Outcome: Not Progressing

## 2017-05-03 NOTE — PAYOR COMM NOTE
Marley Pablo #[id number]  (80 year old M)       [facility] [id number]         Deborah Ray MD Physician Signed  H&P 5/3/2017  8:32 AM      Expand All Collapse All        [facility] Hospitalist H&P        CC: Patient presents with:  Dehydration (metabolic/constitution No outpatient prescriptions have been marked as taking for the 5/2/17 encounter [facility] HOSPITAL Encounter).          Soc Hx      Smoking status:  Former Smoker        Smokeless tobacco:  Not on file      Alcohol Use:  No         Fam Hx  Family History    Problem  Radiology: Xr Chest Ap Portable  (cpt=71010)    5/2/2017  CONCLUSION:           1. Stable findings from February 8, 2017. 2. Atherosclerosis. 3. Hyperinflation. 4. Scarring/atelectasis. 5. Demineralization. 6. Scoliosis. 7. Osteoarthritis.                AS   5/3/2017  8:52 AM MD Beverly Mathur Fax

## 2017-05-03 NOTE — DISCHARGE PLANNING
SW met w/ pt to discuss discharge planning. Pt appeared alert and oriented x3. Pt was answering questions appropriately and only had to be redirected a couple of times. Pt reported that he lives at home alone in an apartment.  Pt reported there are no stair

## 2017-05-03 NOTE — PLAN OF CARE
Focus: HOME MEDS  Data: PT IS CONFUSED AT PRESENT, HE DOES NOT KNOW HIS MEDICATIONS. HE REPORTED THAT HE STOPPED TAKING IT FOR A WHILE NOW. THE PT ALSO DOES NOT KNOW HIS PREVIOUS PHARMACY.  WILL NOTIFY MD IN AM.

## 2017-05-03 NOTE — PLAN OF CARE
Problem: Patient/Family Goals  Goal: Patient/Family Long Term Goal  Patient’s Long Term Goal: Be safe at home    Interventions:  - PT/OT eval for d/c placement recommendations  - Continue safety precautions, alarms on, call light within reach.   - See addit desenex powder ordered.    Goal: Incision(s), wounds(s) or drain site(s) healing without S/S of infection  INTERVENTIONS:  - Assess and document risk factors for pressure ulcer development  - Assess and document skin integrity  - Assess and document dressin

## 2017-05-03 NOTE — PAYOR COMM NOTE
Admit Orders     Start     Ordered    05/03/17 0462  Admit to inpatient Once   Once     Ordering Provider:  Angelita Blevins MD   Question:  Diagnosis  Answer:  Encephalopathy acute    05/03/17 0831    05/03/17 0801  Admit to inpatient Once   Once     Ordering P

## 2017-05-04 PROCEDURE — 85025 COMPLETE CBC W/AUTO DIFF WBC: CPT | Performed by: HOSPITALIST

## 2017-05-04 PROCEDURE — 97162 PT EVAL MOD COMPLEX 30 MIN: CPT

## 2017-05-04 PROCEDURE — 82607 VITAMIN B-12: CPT | Performed by: HOSPITALIST

## 2017-05-04 PROCEDURE — 97530 THERAPEUTIC ACTIVITIES: CPT

## 2017-05-04 PROCEDURE — 80048 BASIC METABOLIC PNL TOTAL CA: CPT | Performed by: HOSPITALIST

## 2017-05-04 RX ORDER — SODIUM CHLORIDE 9 MG/ML
INJECTION, SOLUTION INTRAVENOUS CONTINUOUS
Status: DISCONTINUED | OUTPATIENT
Start: 2017-05-04 | End: 2017-05-05

## 2017-05-04 NOTE — PLAN OF CARE
Problem: Patient/Family Goals  Goal: Patient/Family Long Term Goal  Patient’s Long Term Goal: Be safe at home    Interventions:  - PT/OT eval for d/c placement recommendations  - Continue safety precautions, alarms on, call light within reach.   - See addit

## 2017-05-04 NOTE — PLAN OF CARE
Problem: NEUROLOGICAL - ADULT  Goal: Achieves stable or improved neurological status  INTERVENTIONS  - Assess for and report changes in neurological status  - Monitor temperature, glucose, and sodium.  Initiate appropriate interventions as ordered   Outcome and tolerated well, continues with IV fluids, extra 250ml bolus given this evening d/t orthostatic blood pressure readings per MD order, all needs met, call light left within reach, continue to monitor.

## 2017-05-04 NOTE — PROGRESS NOTES
[facility] Hospitalist Progress Note     CC: Hospital Follow up    PCP: LOCO MORELAND III       Assessment/Plan:     Principal Problem:    Acute kidney injury ([address]  Active Problems:    Dehydration    Hyperglycemia    Azotemia    Weakness generalized    Essential hype (36.5 °C)-98 °F (36.7 °C)] 97.7 °F (36.5 °C)  Pulse:  [71-95] 76  Resp:  [18] 18  BP: ()/(43-82) 127/76 mmHg      Intake/Output:    Intake/Output Summary (Last 24 hours) at 05/04/17 1316  Last data filed at 05/04/17 0655   Gross per 24 hour   Intake 114*   --    CO2  24  22  20*   --        Recent Labs   Lab  05/02/17 1958   ALT  25   AST  31   ALB  3.4*         Imaging:  Ct Brain Or Head ([id number])    5/3/2017  CONCLUSION:  1. No acute intracranial process by noncontrast CT technique.   2. Senescent ch

## 2017-05-04 NOTE — DISCHARGE PLANNING
Pt has been clinically accepted by [facility]. PT saw the pt today, so the updates have been sent to [facility] for the insurance authorization to be continued.     ANGLE delgado [id number]

## 2017-05-04 NOTE — PHYSICAL THERAPY NOTE
PHYSICAL THERAPY EVALUATION - INPATIENT     Room Number: 532/532-A  Evaluation Date: 5/4/2017  Type of Evaluation: Initial  Physician Order: PT Eval and Treat    Presenting Problem:  (Acute kidney Injury, Confusion and weakness)  Reason for Therapy:  Rosita Abad safety, forgetful and some confusion which affects safe transfers, gait and increases risk for falls, which are below the patient's pre-admission status. Pt is at high fall risk and is not safe to be home alone.   Patient will benefit from continued IP PT s oriented to time or situation.         Problem List  Principal Problem:    Acute kidney injury ([facility] [address] [facility]  Active Problems:    Dehydration    Hyperglycemia    Azotemia    Weakness generalized    Essential hypertension    NEISHA (acute kidney injury) ([facility] [address]    Alva Sainz promotion; Body mechanics;Repositioning    COGNITION  · Pt is alert, oriented to name and place. Pt was able to follow commands but seemed confused and forgetful at times.     RANGE OF MOTION AND STRENGTH ASSESSMENT  Upper extremity ROM and strength: Please session/findings; All patient questions and concerns addressed; Alarm set    CURRENT GOALS    Goals to be met by: 5/9/2017   Patient Goal Patient's self-stated goal is: to be able to walk on his own   Goal #1 Patient is able to demonstrate supine - sit EOB @

## 2017-05-05 VITALS
SYSTOLIC BLOOD PRESSURE: 147 MMHG | OXYGEN SATURATION: 98 % | HEART RATE: 82 BPM | BODY MASS INDEX: 24.35 KG/M2 | RESPIRATION RATE: 18 BRPM | TEMPERATURE: 97 F | WEIGHT: 164.38 LBS | DIASTOLIC BLOOD PRESSURE: 74 MMHG | HEIGHT: 69 IN

## 2017-05-05 PROCEDURE — 85007 BL SMEAR W/DIFF WBC COUNT: CPT | Performed by: HOSPITALIST

## 2017-05-05 PROCEDURE — 80048 BASIC METABOLIC PNL TOTAL CA: CPT | Performed by: HOSPITALIST

## 2017-05-05 PROCEDURE — 85027 COMPLETE CBC AUTOMATED: CPT | Performed by: HOSPITALIST

## 2017-05-05 PROCEDURE — 85025 COMPLETE CBC W/AUTO DIFF WBC: CPT | Performed by: HOSPITALIST

## 2017-05-05 RX ORDER — AMLODIPINE BESYLATE 5 MG/1
5 TABLET ORAL DAILY
Status: DISCONTINUED | OUTPATIENT
Start: 2017-05-06 | End: 2017-05-05

## 2017-05-05 RX ORDER — HYDRALAZINE HYDROCHLORIDE 25 MG/1
25 TABLET, FILM COATED ORAL EVERY 8 HOURS PRN
Status: DISCONTINUED | OUTPATIENT
Start: 2017-05-05 | End: 2017-05-05

## 2017-05-05 RX ORDER — AMLODIPINE BESYLATE 2.5 MG/1
2.5 TABLET ORAL DAILY
Qty: 30 TABLET | Refills: 0 | Status: SHIPPED | OUTPATIENT
Start: 2017-05-05 | End: 2017-05-05

## 2017-05-05 RX ORDER — AMLODIPINE BESYLATE 5 MG/1
5 TABLET ORAL DAILY
Qty: 30 TABLET | Refills: 1 | Status: SHIPPED | OUTPATIENT
Start: 2017-05-05

## 2017-05-05 NOTE — PLAN OF CARE
Problem: Patient/Family Goals  Goal: Patient/Family Long Term Goal  Patient’s Long Term Goal: Be safe at home    Interventions:  - PT/OT eval for d/c placement recommendations  - Continue safety precautions, alarms on, call light within reach.   - See addit Discharge  Nystatin powder to groin.   Goal: Incision(s), wounds(s) or drain site(s) healing without S/S of infection  INTERVENTIONS:  - Assess and document risk factors for pressure ulcer development  - Assess and document skin integrity  - Assess and docu

## 2017-05-05 NOTE — PLAN OF CARE
Problem: Patient/Family Goals  Goal: Patient/Family Long Term Goal  Patient’s Long Term Goal: Be safe at home    Interventions:  - PT/OT eval for d/c placement recommendations  - Continue safety precautions, alarms on, call light within reach.   - See addit orders  - Initiate isolation precautions as appropriate  - Initiate Pressure Ulcer prevention bundle as indicated   Outcome: Progressing    Problem: MUSCULOSKELETAL - ADULT  Goal: Return mobility to safest level of function  INTERVENTIONS:  - Assess patiodalis

## 2017-05-05 NOTE — DISCHARGE PLANNING
SW was informed that pt is anticipated to discharge today. Pato Aparicio requested 5:30 d/c time. HECTOR informed RN of d/c time.   HECTOR met w/ pt who is agreeable w/ d/c and [facility] cost. Pt provided SW w/ pt's nieces name - Jasen Vergara who lives in [place], [place] (

## 2017-05-05 NOTE — PHYSICAL THERAPY NOTE
Attempted to see patient but he reports he is going to rehab and will work with therapy there. Wants to have energy to get dressed and ready to go.

## 2017-05-05 NOTE — PROGRESS NOTES
[facility] Hospitalist Progress Note     CC: Hospital Follow up    PCP: LOCO MORELAND III       Assessment/Plan:     Principal Problem:    Acute kidney injury ([address]  Active Problems:    Dehydration    Hyperglycemia    Azotemia    Weakness generalized    Essential hype °C)  Pulse:  [68-83] 69  Resp:  [18] 18  BP: (155-171)/(66-90) 171/76 mmHg      Intake/Output:    Intake/Output Summary (Last 24 hours) at 05/05/17 1321  Last data filed at 05/05/17 0600   Gross per 24 hour   Intake   1212 ml   Output   2450 ml   Net  -123 114*   --   111*   CO2  22  20*   --   24       Recent Labs   Lab  05/02/17 1958   ALT  25   AST  31   ALB  3.4*         Imaging:  Ct Brain Or Head ([id number])    5/3/2017  CONCLUSION:  1. No acute intracranial process by noncontrast CT technique.   2. Senesc

## 2017-05-05 NOTE — PLAN OF CARE
Problem: NEUROLOGICAL - ADULT  Goal: Achieves stable or improved neurological status  INTERVENTIONS  - Assess for and report changes in neurological status  - Monitor temperature, glucose, and sodium.  Initiate appropriate interventions as ordered   Outcome Consider OT/PT consult to assist with strengthening/mobility  - Encourage toileting schedule   Outcome: Progressing    Comments:   Pt is alert and oriented x 3, able to make staff aware of any needs, all due medications given as ordered and tolerated well,

## 2017-11-14 ENCOUNTER — OFFICE VISIT (OUTPATIENT)
Dept: OTOLARYNGOLOGY | Facility: CLINIC | Age: 82
End: 2017-11-14

## 2017-11-14 VITALS
TEMPERATURE: 98 F | DIASTOLIC BLOOD PRESSURE: 80 MMHG | WEIGHT: 189 LBS | SYSTOLIC BLOOD PRESSURE: 140 MMHG | HEIGHT: 69 IN | BODY MASS INDEX: 27.99 KG/M2

## 2017-11-14 DIAGNOSIS — H61.23 BILATERAL IMPACTED CERUMEN: Primary | ICD-10-CM

## 2017-11-14 PROCEDURE — 99203 OFFICE O/P NEW LOW 30 MIN: CPT | Performed by: OTOLARYNGOLOGY

## 2017-11-14 PROCEDURE — G0463 HOSPITAL OUTPT CLINIC VISIT: HCPCS | Performed by: OTOLARYNGOLOGY

## 2017-11-14 NOTE — PROGRESS NOTES
Ketty Brandt is a 80year old male. Patient presents with:  Ear Wax: both ears    HPI:   Feels like a lot of wax in his ears. No pain.      Current Outpatient Prescriptions:  LEVOTHYROXINE SODIUM 50 MCG Oral Tab TAKE 1 TABLET BY MOUTH EVERY MORNING AT 6AM Normal. Skull - Normal.   Oral/Oropharynx Normal Lips - Normal, Tonsils - Normal, Tongue - Normal    Nasal Normal External nose - Normal. Nasal septum - Normal, Turbinates - Normal   Neurological Normal Memory - Normal. Cranial nerves - Cranial nerves II t

## 2019-12-08 ENCOUNTER — HOSPITAL ENCOUNTER (EMERGENCY)
Facility: HOSPITAL | Age: 84
Discharge: HOME | End: 2019-12-08
Attending: EMERGENCY MEDICINE
Payer: MEDICARE

## 2019-12-08 ENCOUNTER — APPOINTMENT (OUTPATIENT)
Dept: CT IMAGING | Facility: HOSPITAL | Age: 84
End: 2019-12-08
Attending: EMERGENCY MEDICINE
Payer: MEDICARE

## 2019-12-08 VITALS
HEART RATE: 79 BPM | OXYGEN SATURATION: 95 % | WEIGHT: 189.63 LBS | BODY MASS INDEX: 28 KG/M2 | TEMPERATURE: 99 F | RESPIRATION RATE: 20 BRPM | SYSTOLIC BLOOD PRESSURE: 167 MMHG | DIASTOLIC BLOOD PRESSURE: 75 MMHG

## 2019-12-08 DIAGNOSIS — R63.0 LOSS OF APPETITE: Primary | ICD-10-CM

## 2019-12-08 PROCEDURE — 81001 URINALYSIS AUTO W/SCOPE: CPT | Performed by: EMERGENCY MEDICINE

## 2019-12-08 PROCEDURE — 85025 COMPLETE CBC W/AUTO DIFF WBC: CPT | Performed by: EMERGENCY MEDICINE

## 2019-12-08 PROCEDURE — 96361 HYDRATE IV INFUSION ADD-ON: CPT

## 2019-12-08 PROCEDURE — 93005 ELECTROCARDIOGRAM TRACING: CPT

## 2019-12-08 PROCEDURE — 51702 INSERT TEMP BLADDER CATH: CPT

## 2019-12-08 PROCEDURE — 80048 BASIC METABOLIC PNL TOTAL CA: CPT | Performed by: EMERGENCY MEDICINE

## 2019-12-08 PROCEDURE — 93010 ELECTROCARDIOGRAM REPORT: CPT | Performed by: EMERGENCY MEDICINE

## 2019-12-08 PROCEDURE — 96360 HYDRATION IV INFUSION INIT: CPT

## 2019-12-08 PROCEDURE — 74176 CT ABD & PELVIS W/O CONTRAST: CPT | Performed by: EMERGENCY MEDICINE

## 2019-12-08 PROCEDURE — 99285 EMERGENCY DEPT VISIT HI MDM: CPT

## 2019-12-08 RX ORDER — ONDANSETRON 2 MG/ML
4 INJECTION INTRAMUSCULAR; INTRAVENOUS ONCE
Status: DISCONTINUED | OUTPATIENT
Start: 2019-12-08 | End: 2019-12-08

## 2019-12-08 RX ORDER — ONDANSETRON 4 MG/1
4 TABLET, ORALLY DISINTEGRATING ORAL EVERY 4 HOURS PRN
Qty: 10 TABLET | Refills: 0 | Status: SHIPPED | OUTPATIENT
Start: 2019-12-08 | End: 2019-12-15

## 2019-12-08 NOTE — ED NOTES
Arranged transport home with [facility], ETA 60-90 minutes.  Pt agreeable to transport and travel by [employer]

## 2019-12-08 NOTE — ED PROVIDER NOTES
Patient Seen in: [facility] AND [facility] Emergency Department      History   Patient presents with:  Poor Feed Anorexia    Stated Complaint: Failure to thrive    HPI    49-year-old male with past medical history significant for high blood pressure, hypothyroid Pulse 80   Resp 18   Temp 99.4 °F (37.4 °C)   Temp src Temporal   SpO2 96 %   O2 Device None (Room air)       Current:BP (!) 178/69   Pulse 75   Temp 99.4 °F (37.4 °C) (Temporal)   Resp 25   Wt 86 kg   SpO2 95%   BMI 28.00 kg/m²         Physical Exam WITH PLATELET.   Procedure                               Abnormality         Status                     ---------                               -----------         ------                     CBC W/ DIFFERENTIAL[[id number]]          Abnormal            Final 53031  [phone]    In 2 days      We recommend that you schedule follow up care with a primary care provider within the next three months to obtain basic health screening including reassessment of your blood pressure.     Medications Prescribed:  Rylan Betts

## 2019-12-08 NOTE — ED INITIAL ASSESSMENT (HPI)
Pt arrives via EMS for not eating x3 days. When attempting to ask patient what's going on, pt states \"Hold on, now shut up\". Pt only complaint is low pelvic pain/pressure but states he is voiding normally.

## 2019-12-08 NOTE — ED NOTES
Pt able to to turn without assist. No skin issues. García catheter placed by Luis Manuel Chilel RN, pt with white thick cheese-like substance under the foreskin.

## 2019-12-10 ENCOUNTER — HOSPITAL ENCOUNTER (EMERGENCY)
Facility: HOSPITAL | Age: 84
Discharge: HOME | End: 2019-12-10
Attending: EMERGENCY MEDICINE
Payer: MEDICARE

## 2019-12-10 VITALS
BODY MASS INDEX: 27.15 KG/M2 | DIASTOLIC BLOOD PRESSURE: 87 MMHG | TEMPERATURE: 98 F | WEIGHT: 189.63 LBS | RESPIRATION RATE: 16 BRPM | SYSTOLIC BLOOD PRESSURE: 190 MMHG | HEART RATE: 76 BPM | HEIGHT: 70 IN | OXYGEN SATURATION: 96 %

## 2019-12-10 DIAGNOSIS — N30.01 ACUTE CYSTITIS WITH HEMATURIA: Primary | ICD-10-CM

## 2019-12-10 PROCEDURE — 81001 URINALYSIS AUTO W/SCOPE: CPT | Performed by: EMERGENCY MEDICINE

## 2019-12-10 PROCEDURE — 93005 ELECTROCARDIOGRAM TRACING: CPT

## 2019-12-10 PROCEDURE — 87086 URINE CULTURE/COLONY COUNT: CPT | Performed by: EMERGENCY MEDICINE

## 2019-12-10 PROCEDURE — 80048 BASIC METABOLIC PNL TOTAL CA: CPT | Performed by: EMERGENCY MEDICINE

## 2019-12-10 PROCEDURE — 87077 CULTURE AEROBIC IDENTIFY: CPT | Performed by: EMERGENCY MEDICINE

## 2019-12-10 PROCEDURE — 36415 COLL VENOUS BLD VENIPUNCTURE: CPT

## 2019-12-10 PROCEDURE — 99284 EMERGENCY DEPT VISIT MOD MDM: CPT

## 2019-12-10 PROCEDURE — 82962 GLUCOSE BLOOD TEST: CPT

## 2019-12-10 PROCEDURE — 93010 ELECTROCARDIOGRAM REPORT: CPT | Performed by: EMERGENCY MEDICINE

## 2019-12-10 PROCEDURE — 85025 COMPLETE CBC W/AUTO DIFF WBC: CPT | Performed by: EMERGENCY MEDICINE

## 2019-12-10 RX ORDER — CEPHALEXIN 500 MG/1
500 CAPSULE ORAL 3 TIMES DAILY
Qty: 15 CAPSULE | Refills: 0 | Status: SHIPPED | OUTPATIENT
Start: 2019-12-10 | End: 2019-12-15

## 2019-12-10 NOTE — ED NOTES
UPDATED ABOUT ETA FOR MEDICAR OF 2 HOURS, DINNER ORDERED. ATTEMPTED TO GIVE REPORT TO TC ADEN AND RN WAS BUSY.

## 2019-12-10 NOTE — ED PROVIDER NOTES
Patient Seen in: [facility] Emergency Department      History   Patient presents with:  Altered Mental Status    Stated Complaint: AMS    HPI    22-year-old male with hypertension, history of prostate cancer, hypothyroidism, presenting for evaluat No      Alcohol/week: 0.0 standard drinks    Drug use: No             Review of Systems    Positive for stated complaint: AMS  Other systems are as noted in HPI. Constitutional and vital signs reviewed.       All other systems reviewed and negative except the following components:       Result Value    Clarity Urine Hazy (*)     Ketones Urine Trace (*)     Blood Urine Small (*)     Urobilinogen Urine 2.0 (*)     Leukocyte Esterase Urine Moderate (*)     WBC Urine 68 (*)     RBC URINE 5 (*)     All other com Impression:  Acute cystitis with hematuria  (primary encounter diagnosis)    Disposition:  Discharge  12/10/2019  5:11 pm    Follow-up:  Jim Cifuentes MD  [id number]  [address] [id number] [phone]    In 3 days      We recommend that

## 2019-12-10 NOTE — ED INITIAL ASSESSMENT (HPI)
SENT FROM SNF FOR EVALUATION OF AMS. PATIENT ALERT AND ORIENTED X3. ANSWERING QUESTIONS APPROPRIATELY. UPSET THAT HE WAS SENT TO THE ER. REFUSING CARE AT THIS TIME.

## 2019-12-10 NOTE — ED NOTES
SPOKE WITH RN MAGDALENO AT [facility] #[phone], THEY REPORT THAT HE WAS HAVING AMS AND ACTING CONFUSED PTA. RN STATES HE WAS LAYING ON FLOOR AND NOT ACTING APPROPRIATELY. HAD UA TESTED A FEW DAYS AGO AND WAS NEGATIVE FOR UTI.  Chao Martinez MD

## 2019-12-11 NOTE — ED NOTES
DIAPER CHANGED AND PT WAS CLEANED UP FROM URINE INCONTINENCE. AWAITING MEDICAR AND DINNER TRAY AT THIS TIME, WILL CONTINUE TO MONITOR.

## 2019-12-11 NOTE — ED NOTES
ATTEMPTED REPORT 2ND TIME TO [facility], RN REMAINS BUSY, REPORT WILL BE GIVEN TO EMS AND HAVE THEM RELAY REPORT ONCE PATIENT RETURNS TO FACILITY.

## 2019-12-16 ENCOUNTER — HOSPITAL ENCOUNTER (EMERGENCY)
Facility: HOSPITAL | Age: 84
Discharge: HOME | End: 2019-12-17
Attending: EMERGENCY MEDICINE
Payer: MEDICARE

## 2019-12-16 DIAGNOSIS — F32.A DEPRESSION, UNSPECIFIED DEPRESSION TYPE: Primary | ICD-10-CM

## 2019-12-16 PROCEDURE — 99285 EMERGENCY DEPT VISIT HI MDM: CPT

## 2019-12-16 NOTE — ED INITIAL ASSESSMENT (HPI)
Yusufedgar Fernandez is here for eval via EMS. Per paramedic, caretaker called 911 d/t patient refusing to eat and at some point stating that \"he wanted to die\".

## 2019-12-17 VITALS
HEART RATE: 78 BPM | DIASTOLIC BLOOD PRESSURE: 77 MMHG | SYSTOLIC BLOOD PRESSURE: 133 MMHG | OXYGEN SATURATION: 96 % | RESPIRATION RATE: 12 BRPM | TEMPERATURE: 97 F

## 2019-12-17 NOTE — ED NOTES
Care assumed Alert and interactive Offers no complaints at present When questioned about depression and suicidal thoughts states \"I'm 80 I think about dying\" reports poor appetite Tolerating PO in ED chief complaint \"they don't give me a call bell, don'

## 2019-12-17 NOTE — BH LEVEL OF CARE ASSESSMENT
Level of Care Assessment Note    General Questions  Why are you here?: \"Everything, there is stress, yes. I don't know what to do. I am not sure what's going on\"  Precipitating Events: Patient is not able to provide hx of about what brought him.  When ask similar behaviors. He's 88 and I don't want to force him to do something he doesn't want to do. He has been paranoid about his doctors and believes he is unsafe at the facility. \" Niece denies there is an imminent risk to her uncle at this time.  She states Patient is refusing to speak due to dementia  Describe: Patient is refusing to speak due to dementia  Current/Recent Harm Toward Others Mitigating Factors: Patient is refusing to speak due to dementia  Have you harmed someone or had thoughts about wanting (Comment)  Depression Description: Per report, patient hasn't been eating and has been laying on the floor wanting to die;  Patient is refusing to speak due to dementia  Anxiety Symptoms: Other (Comment)(Patient is refusing to speak due to dementia)  Panic Seen: 12/16/19  Reason: Change in depression medication  Effectiveness: Unsure, patient refusing to specify                       Current/Previous MH/CD Treatment  Recovery Support Groups: Denies Past History  History of Seclusion/Restraint: No    Alcohol something to you that makes you feel unsafe?: Unable to Assess (Comment)  Have You Ever Been Harmed by a Partner/Caregiver?: (Patient is refusing to speak due to dementia)  Health Concerns r/t Abuse: No  Possible Abuse Reportable to[de-identified] Not appropriate for r occurring. This writer consulted with POA who states that she believes he might be ready to die. She does not believe there is an imminent risk to her uncle and does not believe that hospitalization would benefit him.  She would like to respect his wishes a

## 2019-12-17 NOTE — ED NOTES
Spoke with Keyana at  at [facility], who stated that there is no RN present at this hour. Informed Keyana that pt will be returning and that the niece will contact them about changing to the dementia unit.

## 2019-12-17 NOTE — ED NOTES
Dr. Medrano spoke with pts niece, who wishes for pt to be switched to the dementia care unit at [facility], and have some medications adjusted, and does not want the pt to be admitted to inpatient psychiatry.  Will pass this information on to the RN at [address]

## 2019-12-17 NOTE — ED NOTES
Pt provided with discharge instructions. Verbalized understanding for plan of care at home and follow up. All questions/concerns addressed prior to discharge. [facility]

## 2019-12-19 NOTE — ED NOTES
1:1 direct observation started by this ER-T. Pt resting on cart and watching T.V. RR non labored and even. Pt is calm and cooperative at this time.

## 2019-12-19 NOTE — ED NOTES
Lunch tray at bedside. Pt stated he was not hungry at the moment. Left lunch tray @ bedside incase pt changes his mind.

## 2019-12-19 NOTE — ED NOTES
Assisted Mehrdad with his depends as they were falling down. Depends are dry at this time. Skin is red in groin area. AMENA Murillo made aware. No distress noted. Pt resting on cart and thankful at this time. Pt declined a pillow.

## 2019-12-19 NOTE — ED PROVIDER NOTES
Patient Seen in: [facility] AND [facility] Emergency Department      History   Patient presents with:  Eval-P    Stated Complaint:     HPI    80-year-old male presents from the nursing home for behavioral health evaluation.   Nursing home states that patient has reviewed and are negative. Positive for stated complaint:   Other systems are as noted in HPI. Constitutional and vital signs reviewed. All other systems reviewed and negative except as noted above.     Physical Exam     ED Triage Vitals [12/19/1 Reviewed   ACETAMINOPHEN (TYLENOL), S - Abnormal; Notable for the following components:       Result Value    Acetaminophen <2.0 (*)     All other components within normal limits   BASIC METABOLIC PANEL (8) - Abnormal; Notable for the following components:

## 2019-12-19 NOTE — BH LEVEL OF CARE ASSESSMENT
Level of Care Assessment Note    General Questions  Why are you here?: \"Fantastic, I get to meet with a . \"  Precipitating Events: Patient was brought to ED after he put himself on the floor refusing to comply with staff direction or perform Areas of Concern: Patient's wellbeing. Referral Source  Referral Source: Treatment Center/Nursing Home  Organization Name: [address] [place] [address],[address] Floor of information for CSSR: Patient  In what setting is the screener performed?: in person  1. No  Describe History or Allegation of Inappropriate Contact: n/a  Have you ever damaged/destroyed property or thought about it?: No  Describe Destructive Behavior Toward Property: n/a    Access to Means  Has access to means to attempt suicide or harm other Verbal Abuse to Others[de-identified] Lilibeth Strauss reports verbal abuse to staff and unwilling to comply with staff requests  Potentially Dangerous Behaviors:  Other (comment)(unwilling to engage in ADL's or eat)  Noisy Vocalizations: None  Frequent Distress Calls: No  Res Abuse Assessment  Physical Abuse: Denies  Verbal Abuse: Denies  Sexual Abuse: Denies  Neglect: Denies  Does anyone say or do something to you that makes you feel unsafe?: No  Have You Ever Been Harmed by a Partner/Caregiver?: No  Health Con safety. Patient has a history of depression and demential. Patient engaged in the assessment but had difficulty with word recall and often stringing together many various subjects and timeframes.  Patient would benefit from immediate hospitalization for his

## 2019-12-19 NOTE — ED INITIAL ASSESSMENT (HPI)
Pt sent here for a fall at 0730 per ems [facility] was sent here for evaluation. Pt denies any SI or HI. Per ems the pt has suicidal thoughts/behaviors.

## 2019-12-19 NOTE — ED NOTES
Faxed packet, including med list, to [address] [place] [address] will hold a bed until they have accepting MD.

## 2019-12-20 NOTE — ED NOTES
Received a call from [facility]. Requested that transport be set up for 8:30pm.     Pt accepted at [facility] by Dr. Fouzia Santos.    [address]    Pt will be going to floor [phone] 1

## 2019-12-20 NOTE — ED NOTES
Received a call from PANCHO at [facility]. States the pt has been accepted by Dr. Domenica Reed. Nurse to nurse is complete. [facility] will call back when ok to set up transport.

## (undated) NOTE — ED AVS SNAPSHOT
Angela Aleman   MRN: [id number]    Department:  [facility] Emergency Department   Date of Visit:  12/10/2019           Disclosure     Insurance plans vary and the physician(s) referred by the ER may not be covered by your plan.  Please contact y within the next three months to obtain basic health screening including reassessment of your blood pressure.     IF THERE IS ANY CHANGE OR WORSENING OF YOUR CONDITION, CALL YOUR PRIMARY CARE PHYSICIAN AT ONCE OR RETURN IMMEDIATELY TO THE EMERGENCY DEPARTMEN

## (undated) NOTE — IP AVS SNAPSHOT
[address]  [address] [facility] [phone]                Discharge Summary   5/2/2017    [facility]           Admission Information        Provider Department    5/2/2017 Basilia Cassidy MD [facility] 5sw/Se Generic drug:  Levothyroxine Sodium   Next dose due:  5/6/17 morning          Take by mouth before breakfast.                                 Where to Get Your Medications      Please  your prescriptions at the location directed by your doctor or nu Neutrophil % Lymphocyte % Monocyte % Eosinophil % Basophil % Prelim Neut Abs Final Neut Abs Lymphocyte Abso Monocyte Absolu Eosinophil Abso Basophil Absolu    (05/05/17)  77 (05/05/17)  15 (05/05/17)  5 (05/05/17)  3 (05/05/17)  0 -- (05/05/17)  2.8 (05/0 [address] to help you get signed up for insurance coverage. Patient [address] is a Federal Navigator program that can help with your Affordable Care Act coverage, as well as all types of Medicaid plans.   To get signed up and covere What to report to your healthcare team: Unusual bleeding, abdominal pain, dark, loose bowel movements           Non-Narcotic Pain Medications     aspirin 81 MG Oral Tab       Use: Treat pain, fever, inflammation   Most common side effects: Stomach upset

## (undated) NOTE — ED AVS SNAPSHOT
Joshua Beyer   MRN: [id number]    Department:  [facility] Emergency Department   Date of Visit:  12/8/2019           Disclosure     Insurance plans vary and the physician(s) referred by the ER may not be covered by your plan.  Please contact yo within the next three months to obtain basic health screening including reassessment of your blood pressure.     IF THERE IS ANY CHANGE OR WORSENING OF YOUR CONDITION, CALL YOUR PRIMARY CARE PHYSICIAN AT ONCE OR RETURN IMMEDIATELY TO THE EMERGENCY DEPARTMEN

## (undated) NOTE — IP AVS SNAPSHOT
Patient Demographics     Address Phone    [address] [phone] [facility])      Emergency Contact(s)     Name Relation Home Work Mobile    None,None  [phone]        Allergies as of 5/5/2017  Reviewed on: 5/3/2017    No Known John Please  your prescriptions at the location directed by your doctor or nurse     Bring a paper prescription for each of these medications    - AmLODIPine Besylate 5 MG Tabs            [id number] - MAR ACTION REPORT  (last 24 hrs)    ** SITE UNKNOWN ** ---------                               -----------         ------                     CBC W/ DIFFERENTIAL[[id number]]          Abnormal            Final result                 Please view results for these tests on the individual orders.     Specimen Inform Author:  Geni Santana MD Service:  (none) Author Type:  Physician    Filed:  5/3/2017  8:52 AM Note Time:  5/3/2017  8:32 AM Status:  Signed    :  Geni Santana MD (Physician)             [facility] Hospitalist H&P       CC: Patient presents with:  Dehydration Smoking status: Former Smoker     Smokeless tobacco: Not on file    Alcohol Use: No        Fam Hx  Family History   Problem Relation Age of Onset   • Glaucoma Neg    • Macular degeneration Neg    • Retinal detachment Neg    • Diabetes Neg        Review of ASSESSMENT / PLAN:   Patient is a 80year old male with PMH sig for prostate cancer, hypothyroidism, HTN, who presents with confusion, weakness.      Acute Metabolic encephalopathy   - AO*2, repeats the same sentence over and over  - no focal findings, po Filed:  5/5/2017  3:16 PM Note Time:  5/5/2017  3:06 PM Status:  Signed    :  Garrison Al MD (Physician)           General Medicine Discharge Summary     Patient ID:  Angela Aleman  80year old  10/14/1931    Admit date: 5/2/2017    Discharge date an stopping ACE. Patient unable to take care of himself, and generally weak, therefore was discharged to rehab, may need permanent placement thereafter.        Acute Metabolic encephalopathy    - improved   - AO*2, less repetitive, more concrete thought proce AmLODIPine Besylate 5 MG Tabs   Commonly known as:  NORVASC   Take 1 tablet (5 mg total) by mouth daily.          CONTINUE taking these medications          aspirin 81 MG Tabs       atorvastatin 10 MG Tabs   Commonly known as:  LIPITOR       SYNTHROID 48 M Type of Evaluation: Initial  Physician Order: PT Eval and Treat    Presenting Problem:  (Acute kidney Injury, Confusion and weakness)  Reason for Therapy: Mobility Dysfunction and Discharge Planning and gait training    PHYSICAL THERAPY ASSESSMENT     Lashanda transfers, gait and increases risk for falls, which are below the patient's pre-admission status. Pt is at high fall risk and is not safe to be home alone.   Patient will benefit from continued IP PT services to address these deficits in preparation for dis Principal Problem:    Acute kidney injury ([facility] [address]  Active Problems:    Dehydration    Hyperglycemia    Azotemia    Weakness generalized    Essential hypertension    NEISHA (acute kidney injury) ([facility] [address]    Encephalopathy acute      Past Medical History  Past Medic · Pt is alert, oriented to name and place. Pt was able to follow commands but seemed confused and forgetful at times. RANGE OF MOTION AND STRENGTH ASSESSMENT  Upper extremity ROM and strength: Please see OT eval for UE assessment.     Lower extremity ROM addressed; Alarm set    CURRENT GOALS    Goals to be met by: 5/9/2017   Patient Goal Patient's self-stated goal is: to be able to walk on his own   Goal #1 Patient is able to demonstrate supine - sit EOB @ level: modified independent     Goal #1   Current S with short term recall, visual perception, calculation, and cognitive flexibility. Patient is aware that he is more confused than usual. During this session, he had c/o dizziness after activity. His BP was noted to drop to 87/57 after activity.  This improv • Entropion of right lower eyelid 2012   • Ophthalmological disorder 2013     Capsular phimosis after cataract, OD       Past Surgical History      Past Surgical History    ENTROPION REPAIR, TARSAL WEDGE EXCISION - OD - RIGHT EYE Right 9/20/2012    Comment -   Taking care of personal grooming such as brushing teeth?: A Little  -   Eating meals?: A Little    AM-PAC Score:  Score: 18  Approx Degree of Impairment: 46.65%  Standardized Score (AM-PAC Scale): 38.66  CMS Modifier (G-Code): CK    FUNCTIONAL TRANSFER

## (undated) NOTE — ED AVS SNAPSHOT
Jaxon Lane   MRN: [id number]    Department:  [facility] Emergency Department   Date of Visit:  12/16/2019           Disclosure     Insurance plans vary and the physician(s) referred by the ER may not be covered by your plan.  Please contact y within the next three months to obtain basic health screening including reassessment of your blood pressure.     IF THERE IS ANY CHANGE OR WORSENING OF YOUR CONDITION, CALL YOUR PRIMARY CARE PHYSICIAN AT ONCE OR RETURN IMMEDIATELY TO THE EMERGENCY DEPARTMEN